# Patient Record
Sex: MALE | Race: WHITE | Employment: STUDENT | URBAN - METROPOLITAN AREA
[De-identification: names, ages, dates, MRNs, and addresses within clinical notes are randomized per-mention and may not be internally consistent; named-entity substitution may affect disease eponyms.]

---
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
## 2024-08-02 ENCOUNTER — OFFICE VISIT (OUTPATIENT)
Dept: FAMILY MEDICINE CLINIC | Facility: CLINIC | Age: 19
End: 2024-08-02
Payer: COMMERCIAL

## 2024-08-02 VITALS
HEART RATE: 61 BPM | WEIGHT: 240 LBS | HEIGHT: 76 IN | BODY MASS INDEX: 29.22 KG/M2 | DIASTOLIC BLOOD PRESSURE: 63 MMHG | RESPIRATION RATE: 16 BRPM | SYSTOLIC BLOOD PRESSURE: 138 MMHG | TEMPERATURE: 97 F | OXYGEN SATURATION: 96 %

## 2024-08-02 DIAGNOSIS — B34.9 VIRAL ILLNESS: Primary | ICD-10-CM

## 2024-08-02 LAB
OPERATOR ID: NORMAL
RAPID SARS-COV-2 BY PCR: NOT DETECTED

## 2024-08-02 PROCEDURE — U0002 COVID-19 LAB TEST NON-CDC: HCPCS | Performed by: NURSE PRACTITIONER

## 2024-08-02 PROCEDURE — 99213 OFFICE O/P EST LOW 20 MIN: CPT | Performed by: NURSE PRACTITIONER

## 2024-08-02 NOTE — PROGRESS NOTES
CHIEF COMPLAINT:     Chief Complaint   Patient presents with    Covid-19 Test     SX x 2 days, headache, nausea, recent exposure to Covid  Denies fever   Denies OTC medication         HPI:   Phillip Torres is a 19 year old male who presents for upper respiratory symptoms for  2 days. Patient reports  upset stomach, nausea, headache,, rhinorrhea, congestion,  and diarrhea (2-3 yesterday, none today) . Symptoms have been stable since onset.  Treating symptoms with none.   Associated symptoms include see above.    Mom recently tested positive for covid. Pt would like covid test.     Eating and drinking. No black or bloody stool.     Current Outpatient Medications   Medication Sig Dispense Refill    EPINEPHrine (EPIPEN 2-DRU) 0.3 MG/0.3ML Injection Device 2 duopak (4 pens total); auto inject IM x1 then call 911. 4 Device 3    Ibuprofen 200 MG Oral Cap Take by mouth. Taking 800mg as needed (Patient not taking: Reported on 8/2/2024)      acetaminophen 500 MG Oral Tab Take 500 mg by mouth every 6 (six) hours as needed for Pain. 1500 mg as needed (Patient not taking: Reported on 8/2/2024)      EPINEPHrine (EPIPEN 2-DRU) 0.3 MG/0.3ML Injection Solution Auto-injector Use as directed (Patient not taking: Reported on 9/18/2020) 1 each 2      Past Medical History:    ECZEMA      FOOD ALLERGY        Past Surgical History:   Procedure Laterality Date    Other surgical history Left     Ankle sx,    Tonsillectomy           Social History     Socioeconomic History    Marital status: Single   Tobacco Use    Smoking status: Never     Passive exposure: Yes    Smokeless tobacco: Never   Vaping Use    Vaping status: Never Used   Substance and Sexual Activity    Alcohol use: Yes    Drug use: Yes     Frequency: 2.0 times per week     Types: Cannabis     Comment: Started to use this summer     Sexual activity: Not Currently     Partners: Female     Birth control/protection: Condom         REVIEW OF SYSTEMS:   GENERAL: good appetite  SKIN: no  rashes or abnormal skin lesions  HEENT: See HPI. No ear pain or sore throat.   LUNGS: denies shortness of breath or wheezing, See HPI  CARDIOVASCULAR: denies chest pain or palpitations   GI: denies emesis No abdominal pain  NEURO: + headaches    EXAM:   /63   Pulse 61   Temp 97.4 °F (36.3 °C)   Resp 16   Ht 6' 4\" (1.93 m)   Wt 240 lb (108.9 kg)   SpO2 96%   BMI 29.21 kg/m²   GENERAL: well developed, well nourished,in no apparent distress  SKIN: no rashes,no suspicious lesions  HEAD: atraumatic, normocephalic.  No tenderness on palpation of  sinuses  EYES: conjunctiva clear, EOM intact  EARS: TM's gray, no bulging, no retraction,no fluid, bony landmarks visible  NOSE: Nostrils patent, no nasal discharge, nasal mucosa pink   THROAT: Oral mucosa pink, moist. Posterior pharynx is not erythematous. no exudates. Tonsils 1/4.    NECK: Supple, non-tender  LUNGS: clear to auscultation bilaterally, no wheezes or rhonchi. Breathing is non labored.  CARDIO: RRR without murmur  GI: ABD soft and non distended. Active BS's x4,no masses, hepatosplenomegaly, or tenderness on direct palpation  EXTREMITIES: no cyanosis, clubbing or edema  LYMPH:  No lymphadenopathy.      Recent Results (from the past 168 hour(s))   Rapid Covid-19    Collection Time: 08/02/24  6:29 PM    Specimen: Nares   Result Value Ref Range    Rapid SARS-CoV-2 by PCR Not Detected Not Detected    POCT Lot Number [id number]     POCT Expiration Date 12/18/2025     POCT Procedure Control Control Valid Control Valid     ,365          ASSESSMENT AND PLAN:   Phillip Torres is a 19 year old male who presents with upper respiratory symptoms that are consistent with    ASSESSMENT:   Encounter Diagnosis   Name Primary?    Viral illness Yes       PLAN: Meds as below.  Comfort care as described in Patient Instructions    Meds & Refills for this Visit:  Requested Prescriptions      No prescriptions requested or ordered in this encounter     Rapid covid is  negative.     Likely viral. Encouraged patient to test at home or obtain order via his PCP for repeat testing if symptom worsen. Pt voiced understanding.     Discussed with patient that their blood pressure is elevated. Advised patient to follow up with their PCP within 1 week for further management and evaluation. Pt verbalized understanding.     Luna diet discussed.     Discussed s/s of worsening infection/condition with Patient and importance of prompt medical re-evaluation including when to seek emergency care. Patient  voiced understanding    Increase fluids and rest.     May consider OTC tylenol or ibuprofen as needed and directed on packaging for pain/fever    May consider OTC saline nasal spray per box instructions    All questions and concerns addressed. Encouraged Patient  to call clinic with any questions or concerns. I explained to the patient that emergent conditions may arise and to go to the ER for new, worsening or any persistent conditions.    Patient Instructions   See attached patient care instructions.      The patient indicates understanding of these issues and agrees to the plan.  The patient is asked to return if sx's persist or worsen.

## 2025-01-09 ENCOUNTER — HOSPITAL ENCOUNTER (EMERGENCY)
Facility: HOSPITAL | Age: 20
Discharge: HOME | End: 2025-01-09
Attending: PEDIATRICS
Payer: COMMERCIAL

## 2025-01-09 ENCOUNTER — APPOINTMENT (OUTPATIENT)
Dept: GENERAL RADIOLOGY | Facility: HOSPITAL | Age: 20
End: 2025-01-09
Attending: PEDIATRICS
Payer: COMMERCIAL

## 2025-01-09 VITALS
TEMPERATURE: 98 F | DIASTOLIC BLOOD PRESSURE: 71 MMHG | RESPIRATION RATE: 20 BRPM | BODY MASS INDEX: 32 KG/M2 | SYSTOLIC BLOOD PRESSURE: 141 MMHG | HEART RATE: 59 BPM | WEIGHT: 264.56 LBS | OXYGEN SATURATION: 98 %

## 2025-01-09 DIAGNOSIS — J20.9 ACUTE BRONCHITIS, UNSPECIFIED ORGANISM: Primary | ICD-10-CM

## 2025-01-09 LAB
ALBUMIN SERPL-MCNC: 4.9 G/DL (ref 3.2–4.8)
ALBUMIN/GLOB SERPL: 1.6 {RATIO} (ref 1–2)
ALP LIVER SERPL-CCNC: 61 U/L
ALT SERPL-CCNC: 91 U/L
ANION GAP SERPL CALC-SCNC: 11 MMOL/L (ref 0–18)
AST SERPL-CCNC: 35 U/L (ref ?–34)
ATRIAL RATE: 72 BPM
BASOPHILS # BLD AUTO: 0.08 X10(3) UL (ref 0–0.2)
BASOPHILS NFR BLD AUTO: 0.7 %
BILIRUB SERPL-MCNC: 1 MG/DL (ref 0.3–1.2)
BUN BLD-MCNC: 12 MG/DL (ref 9–23)
CALCIUM BLD-MCNC: 10.1 MG/DL (ref 8.7–10.4)
CHLORIDE SERPL-SCNC: 105 MMOL/L (ref 98–112)
CO2 SERPL-SCNC: 23 MMOL/L (ref 21–32)
CREAT BLD-MCNC: 0.99 MG/DL
CRP SERPL-MCNC: 2.8 MG/DL (ref ?–0.5)
EGFRCR SERPLBLD CKD-EPI 2021: 112 ML/MIN/1.73M2 (ref 60–?)
EOSINOPHIL # BLD AUTO: 0.43 X10(3) UL (ref 0–0.7)
EOSINOPHIL NFR BLD AUTO: 3.9 %
ERYTHROCYTE [DISTWIDTH] IN BLOOD BY AUTOMATED COUNT: 12.3 %
FLUAV + FLUBV RNA SPEC NAA+PROBE: NEGATIVE
FLUAV + FLUBV RNA SPEC NAA+PROBE: NEGATIVE
GLOBULIN PLAS-MCNC: 3.1 G/DL (ref 2–3.5)
GLUCOSE BLD-MCNC: 101 MG/DL (ref 70–99)
HCT VFR BLD AUTO: 45.6 %
HGB BLD-MCNC: 16.6 G/DL
IMM GRANULOCYTES # BLD AUTO: 0.08 X10(3) UL (ref 0–1)
IMM GRANULOCYTES NFR BLD: 0.7 %
LIPASE SERPL-CCNC: 33 U/L (ref 12–53)
LYMPHOCYTES # BLD AUTO: 1.11 X10(3) UL (ref 1–4)
LYMPHOCYTES NFR BLD AUTO: 10.1 %
MCH RBC QN AUTO: 29.7 PG (ref 26–34)
MCHC RBC AUTO-ENTMCNC: 36.4 G/DL (ref 31–37)
MCV RBC AUTO: 81.7 FL
MONOCYTES # BLD AUTO: 1.01 X10(3) UL (ref 0.1–1)
MONOCYTES NFR BLD AUTO: 9.2 %
NEUTROPHILS # BLD AUTO: 8.23 X10 (3) UL (ref 1.5–7.7)
NEUTROPHILS # BLD AUTO: 8.23 X10(3) UL (ref 1.5–7.7)
NEUTROPHILS NFR BLD AUTO: 75.4 %
OSMOLALITY SERPL CALC.SUM OF ELEC: 288 MOSM/KG (ref 275–295)
P AXIS: 59 DEGREES
P-R INTERVAL: 160 MS
PLATELET # BLD AUTO: 273 10(3)UL (ref 150–450)
POTASSIUM SERPL-SCNC: 3.8 MMOL/L (ref 3.5–5.1)
PROT SERPL-MCNC: 8 G/DL (ref 5.7–8.2)
Q-T INTERVAL: 362 MS
QRS DURATION: 78 MS
QTC CALCULATION (BEZET): 396 MS
R AXIS: 58 DEGREES
RBC # BLD AUTO: 5.58 X10(6)UL
RSV RNA SPEC NAA+PROBE: NEGATIVE
SARS-COV-2 RNA RESP QL NAA+PROBE: NOT DETECTED
SODIUM SERPL-SCNC: 139 MMOL/L (ref 136–145)
T AXIS: 40 DEGREES
VENTRICULAR RATE: 72 BPM
WBC # BLD AUTO: 10.9 X10(3) UL (ref 4–11)

## 2025-01-09 PROCEDURE — 99285 EMERGENCY DEPT VISIT HI MDM: CPT

## 2025-01-09 PROCEDURE — 96375 TX/PRO/DX INJ NEW DRUG ADDON: CPT

## 2025-01-09 PROCEDURE — 71045 X-RAY EXAM CHEST 1 VIEW: CPT | Performed by: PEDIATRICS

## 2025-01-09 PROCEDURE — 0241U SARS-COV-2/FLU A AND B/RSV BY PCR (GENEXPERT): CPT | Performed by: PEDIATRICS

## 2025-01-09 PROCEDURE — 96374 THER/PROPH/DIAG INJ IV PUSH: CPT

## 2025-01-09 PROCEDURE — 87430 STREP A AG IA: CPT | Performed by: PEDIATRICS

## 2025-01-09 PROCEDURE — 93005 ELECTROCARDIOGRAM TRACING: CPT

## 2025-01-09 PROCEDURE — 99284 EMERGENCY DEPT VISIT MOD MDM: CPT

## 2025-01-09 PROCEDURE — 85025 COMPLETE CBC W/AUTO DIFF WBC: CPT | Performed by: PEDIATRICS

## 2025-01-09 PROCEDURE — 96361 HYDRATE IV INFUSION ADD-ON: CPT

## 2025-01-09 PROCEDURE — 80053 COMPREHEN METABOLIC PANEL: CPT | Performed by: PEDIATRICS

## 2025-01-09 PROCEDURE — 93010 ELECTROCARDIOGRAM REPORT: CPT

## 2025-01-09 PROCEDURE — 83690 ASSAY OF LIPASE: CPT | Performed by: PEDIATRICS

## 2025-01-09 PROCEDURE — 86140 C-REACTIVE PROTEIN: CPT | Performed by: PEDIATRICS

## 2025-01-09 RX ORDER — KETOROLAC TROMETHAMINE 30 MG/ML
15 INJECTION, SOLUTION INTRAMUSCULAR; INTRAVENOUS ONCE
Status: COMPLETED | OUTPATIENT
Start: 2025-01-09 | End: 2025-01-09

## 2025-01-09 RX ORDER — DEXTROAMPHETAMINE SACCHARATE, AMPHETAMINE ASPARTATE MONOHYDRATE, DEXTROAMPHETAMINE SULFATE AND AMPHETAMINE SULFATE 2.5; 2.5; 2.5; 2.5 MG/1; MG/1; MG/1; MG/1
10 CAPSULE, EXTENDED RELEASE ORAL DAILY
COMMUNITY
Start: 2024-12-12 | End: 2025-01-11

## 2025-01-09 RX ORDER — DEXAMETHASONE 4 MG/1
16 TABLET ORAL ONCE
Status: COMPLETED | OUTPATIENT
Start: 2025-01-09 | End: 2025-01-09

## 2025-01-09 RX ORDER — ONDANSETRON 4 MG/1
4 TABLET, ORALLY DISINTEGRATING ORAL EVERY 6 HOURS PRN
Qty: 10 TABLET | Refills: 0 | Status: SHIPPED | OUTPATIENT
Start: 2025-01-09 | End: 2025-01-16

## 2025-01-09 RX ORDER — ONDANSETRON 2 MG/ML
4 INJECTION INTRAMUSCULAR; INTRAVENOUS ONCE
Status: COMPLETED | OUTPATIENT
Start: 2025-01-09 | End: 2025-01-09

## 2025-01-09 RX ORDER — AZITHROMYCIN 250 MG/1
TABLET, FILM COATED ORAL
Qty: 6 TABLET | Refills: 0 | Status: SHIPPED | OUTPATIENT
Start: 2025-01-09 | End: 2025-01-14

## 2025-01-09 NOTE — PROGRESS NOTES
Pt resting quietly, continues with congested cough, decadron given. VS stable. Await further plan

## 2025-01-09 NOTE — ED PROVIDER NOTES
Patient Seen in: [facility] Emergency Department      History     Chief Complaint   Patient presents with    Nausea/Vomiting/Diarrhea    Abdomen/Flank Pain     Stated Complaint: vomiting, abd pain    Subjective:   19-year-old male with a history of food allergies and chronic abdominal pain presents to the ED with several days of cough URI symptoms congestion sore throat generalized malaise nausea vomiting and epigastric abdominal pain.  No associated fevers, diarrhea, urinary symptoms or rash.  States that he has had an extensive GI workup including endoscopy however no prior abdominal surgeries.              Objective:     Past Medical History:    ADHD    ECZEMA      FOOD ALLERGY                Past Surgical History:   Procedure Laterality Date    Other surgical history Left     Ankle sx,    Tonsillectomy                  Social History     Socioeconomic History    Marital status: Single   Tobacco Use    Smoking status: Never     Passive exposure: Yes    Smokeless tobacco: Never   Vaping Use    Vaping status: Never Used   Substance and Sexual Activity    Alcohol use: Yes    Drug use: Yes     Frequency: 4.0 times per week     Types: Cannabis     Comment: Started to use this summer     Sexual activity: Not Currently     Partners: Female     Birth control/protection: Condom                  Physical Exam     ED Triage Vitals   BP 01/09/25 1143 142/90   Pulse 01/09/25 1143 94   Resp 01/09/25 1143 18   Temp 01/09/25 1215 98.4 °F (36.9 °C)   Temp src --    SpO2 01/09/25 1143 95 %   O2 Device 01/09/25 1143 None (Room air)       Current Vitals:   Vital Signs  BP: 129/72  Pulse: 66  Resp: 20  Temp: 98.4 °F (36.9 °C)  MAP (mmHg): 89    Oxygen Therapy  SpO2: 97 %  O2 Device: None (Room air)        Physical Exam  Vitals and nursing note reviewed.   Constitutional:       General: He is not in acute distress.     Appearance: Normal appearance. He is not ill-appearing, toxic-appearing or diaphoretic.      Comments:  Afebrile, nontoxic-appearing   HENT:      Head: Normocephalic and atraumatic.      Ears:      Comments: TMs with clear serous fluid nonbulging     Nose: Congestion present.      Mouth/Throat:      Mouth: Mucous membranes are moist.      Pharynx: Oropharynx is clear. Posterior oropharyngeal erythema present. No oropharyngeal exudate.   Eyes:      Extraocular Movements: Extraocular movements intact.      Conjunctiva/sclera: Conjunctivae normal.      Pupils: Pupils are equal, round, and reactive to light.   Cardiovascular:      Rate and Rhythm: Normal rate and regular rhythm.      Pulses: Normal pulses.      Heart sounds: Normal heart sounds.   Pulmonary:      Effort: Pulmonary effort is normal. No respiratory distress.      Breath sounds: Normal breath sounds. No stridor. No wheezing.      Comments: Respiratory rate 18, sats 95% on room air, coughing however no retractions wheezes or crackles  Abdominal:      General: Abdomen is flat. There is no distension.      Palpations: Abdomen is soft.      Comments: Very mild epigastric tenderness, no rebound or guarding    No right lower quadrant or left lower quadrant tenderness   Musculoskeletal:         General: Normal range of motion.      Cervical back: Normal range of motion and neck supple. No rigidity.   Skin:     General: Skin is warm.      Capillary Refill: Capillary refill takes less than 2 seconds.   Neurological:      General: No focal deficit present.      Mental Status: He is alert and oriented to person, place, and time.      Cranial Nerves: No cranial nerve deficit.             ED Course     Labs Reviewed   COMP METABOLIC PANEL (14) - Abnormal; Notable for the following components:       Result Value    Glucose 101 (*)     AST 35 (*)     ALT 91 (*)     Albumin 4.9 (*)     All other components within normal limits   CBC WITH DIFFERENTIAL WITH PLATELET - Abnormal; Notable for the following components:    Neutrophil Absolute Prelim 8.23 (*)     Neutrophil  Absolute 8.23 (*)     Monocyte Absolute 1.01 (*)     All other components within normal limits   C-REACTIVE PROTEIN - Abnormal; Notable for the following components:    C-Reactive Protein 2.80 (*)     All other components within normal limits   LIPASE - Normal   RAPID STREP A SCREEN (LC) - Normal    Narrative:     A confirmatory culture is recommended if clinically indicated.   SARS-COV-2/FLU A AND B/RSV BY PCR (GENEXPERT) - Normal    Narrative:     This test is intended for the qualitative detection and differentiation of SARS-CoV-2, influenza A, influenza B, and respiratory syncytial virus (RSV) viral RNA in nasopharyngeal or nares swabs from individuals suspected of respiratory viral infection consistent with COVID-19 by their healthcare provider. Signs and symptoms of respiratory viral infection due to SARS-CoV-2, influenza, and RSV can be similar.    Test performed using the Xpert Xpress SARS-CoV-2/FLU/RSV (real time RT-PCR)  assay on the [employer] instrument, [employer], [place] [address].   This test is being used under the Food and Drug Administration's Emergency Use Authorization.    The authorized Fact Sheet for Healthcare Providers for this assay is available upon request from the laboratory.       ED Course as of 01/09/25 1326  ------------------------------------------------------------  Time: 01/09 1228  Comment: Rapid strep negative  ------------------------------------------------------------  Time: 01/09 1238  Comment: Serum lab work notable for mildly elevated CRP otherwise mostly unremarkable  ------------------------------------------------------------  Time: 01/09 1247  Comment: Chest x-ray without focal consolidation or pneumonia  ------------------------------------------------------------  Time: 01/09 1325  Comment: Patient remains well-appearing with reassuring physical exam and vital signs.  Will discharge home with as needed Zofran and a Z-Seamus course.  Recommend close PCP follow-up with  strict return precautions to the ED.       Assessment & Plan: Well-appearing with likely acute viral syndrome/bronchitis.  Possible pneumonia.  Low concern for acute surgical abdomen or obstruction.  Will obtain viral and strep swabs as well as serum lab work and chest x-ray.  Patient will receive IV fluid bolus Toradol Zofran and Decadron.     Independent historian:   Pertinent co-morbidities affecting presentation: None   Differential diagnoses considered: I considered various etiologies / differetial diagosis including but not limited to, viral syndrome, bronchitis, pneumonia, gastritis, low concern for acute surgical abdomen or obstruction. The patient was well-appearing and did not show any evidence of serious bacterial infection.  Diagnostic tests considered but not performed: Abdominal CT -very low suspicion for acute surgical abdomen    ED Course:    Prescription drug management considerations: prn Zofran, Z-memo  Consideration regarding hospitalization or escalation of care: None at this time  Social determinants of health: None       I have considered other serious etiologies for this patient's complaints, however the presentation is not consistent with such entities. Patient was screened and evaluated during this visit.   As a treating physician attending to the patient, I determined, within reasonable clinical confidence and prior to discharge, that an emergency medical condition was not or was no longer present. Patient or caregiver understands the course of events that occurred in the emergency department. Instructions when to seek emergent medical care was reviewed. Advised parent or caregiver to follow up with primary care physician.        This report has been produced using speech recognition software and may contain errors related to that system including, but not limited to, errors in grammar, punctuation, and spelling, as well as words and phrases that possibly may have been recognized  inappropriately.  If there are any questions or concerns, contact the dictating provider for clarification.         MDM      Radiology:  Imaging ordered independently visualized and interpreted by myself (along with review of radiologist's interpretation) and noted the following: CXR without focal consolidation or pneumonia    XR CHEST AP PORTABLE  (CPT=71045)    Result Date: 1/9/2025  CONCLUSION:  Normal cardiac and mediastinal contours.  No pulmonary edema or focal airspace consolidation.  The pleural spaces are clear.  Regional osseous structures are normal.    LOCATION:  [facility]      Dictated by (CST): Yanna Barraza MD on 1/09/2025 at 1:05 PM     Finalized by (CST): Yanna Barraza MD on 1/09/2025 at 1:06 PM        Labs:  ^^ Personally ordered, reviewed, and interpreted all unique tests ordered.  Clinically significant labs noted: serum lab work mostly unremarkable    Medications administered:  Medications   lactated ringers IV bolus 1,000 mL (1,000 mL Intravenous New Bag 1/9/25 1150)   ondansetron (Zofran) 4 MG/2ML injection 4 mg (4 mg Intravenous Given 1/9/25 1149)   ketorolac (Toradol) 30 MG/ML injection 15 mg (15 mg Intravenous Given 1/9/25 1149)   dexamethasone (Decadron) tab 16 mg (16 mg Oral Given 1/9/25 1236)       Pulse oximetry:  Pulse oximetry on room air is 95% and is normal.     Cardiac monitoring:  Initial heart rate is 94 and is normal for age    Vital signs:  Vitals:    01/09/25 1153 01/09/25 1200 01/09/25 1215 01/09/25 1230   BP:    129/72   Pulse:  84  66   Resp:  23  20   Temp:   98.4 °F (36.9 °C)    SpO2:  94%  97%   Weight: 120 kg          Chart review:  ^^ Review of prior external notes from unique sources (non-[facility] ED records): noted in history : 1/7/25: PCP office visit for URI    Independent EKG interpretation (interpreted by myself):  Rhythm: normal sinus rhythm     Rate: 72   Axis: Normal   Intervals: QTc= 396ms,  QRS=  78ms  St segements:  normal  t-waves: normal   Overall read: No  abnormalities    Magdi Zayas MD   12:29 PM        Disposition and Plan     Clinical Impression:  1. Acute bronchitis, unspecified organism         Disposition:  Discharge  1/9/2025  1:25 pm    Follow-up:  Moe Finch MD  [address] [place] [address]  [address]  [facility] [address]  [phone]    Schedule an appointment as soon as possible for a visit      [facility] Emergency Department  [address] [facility] [address]  [phone]  Follow up  If symptoms worsen          Medications Prescribed:  Current Discharge Medication List        START taking these medications    Details   ondansetron 4 MG Oral Tablet Dispersible Take 1 tablet (4 mg total) by mouth every 6 (six) hours as needed.  Qty: 10 tablet, Refills: 0      azithromycin (ZITHROMAX Z-DRU) 250 MG Oral Tab 500 mg once followed by 250 mg daily x 4 days  Qty: 6 tablet, Refills: 0                 Supplementary Documentation:

## 2025-01-09 NOTE — ED QUICK NOTES
Chief complaint:   Chief Complaint   Patient presents with   â¢ Derm Problem     on face started 3 days ago. Vitals:  Visit Vitals  /82 (BP Location: RUST, Patient Position: Sitting, Cuff Size: Large Adult)   Pulse 88   Ht 6' (1.829 m)   Wt 102.7 kg   SpO2 98%   BMI 30.69 kg/mÂ²       HISTORY OF PRESENT ILLNESS     HPI  This is a 79-year-old gentleman with history of migraine, Gilbert's syndrome, impaired fasting glucose, retinitis pigmentosa, vitamin D deficiency, hypertension, presented with a rash at the temples that started 3 days ago. He is also having some rash at both groin areas extended towards the upper inner thighs. He had noticed the rash at the groin areas for the last couple weeks. He was diagnosed with herpes in the past, the rash was pretty much the same. He was not tested for HSV. He always thought that this rash was associated with herpes. He had noticed slight this formation at the temples which had started 3 days ago, he did have stripping of hair at this area by the cortez prior to the rash. Other significant problems:  Patient Active Problem List    Diagnosis Date Noted   â¢ Peripheral visual field defect 03/24/2016     Priority: Low   â¢ Migraine headache without aura 08/07/2014     Priority: Low   â¢ Meralgia paresthetica of left side 08/07/2014     Priority: Low       PAST MEDICAL, FAMILY AND SOCIAL HISTORY     Medications:  Current Outpatient Medications   Medication   â¢ losartan (COZAAR) 100 MG tablet   â¢ Eletriptan Hydrobromide (RELPAX PO)   â¢ SUMAtriptan (IMITREX STATDOSE) 6 MG/0.5ML auto-injector   â¢ ketoconazole (NIZORAL) 2 % cream   â¢ Cholecalciferol (VITAMIN D3) 5000 units capsule     No current facility-administered medications for this visit.         Allergies:  ALLERGIES:  No Known Allergies    Past Medical  History/Surgeries:  Past Medical History:   Diagnosis Date   â¢ Cataract, left eye    â¢ Essential (primary) hypertension    â¢ Gilbert syndrome    â¢ Meralgia paresthetica PIV removed, tip intact. VS stable. Pt reports feeling better from initial. DCI discussed with pt and mom who verbalized understanding. Pt ambulating steady out of ER   of left side 8/7/2014   â¢ Migraine    â¢ Retinitis pigmentosa of both eyes    â¢ Vitamin D deficiency        Past Surgical History:   Procedure Laterality Date   â¢ Eye surgery      right cataract       Family History:  Family History   Problem Relation Age of Onset   â¢ Stroke Father    â¢ Diabetes Father    â¢ High blood pressure Mother    â¢ Arthritis Mother    â¢ Migraines Brother        Social History:  Social History     Tobacco Use   â¢ Smoking status: Former Smoker     Packs/day: 0.50     Types: Pipe   â¢ Smokeless tobacco: Former User   â¢ Tobacco comment: Stopped all smoking    Substance Use Topics   â¢ Alcohol use: No     Alcohol/week: 0.0 oz       REVIEW OF SYSTEMS     Review of Systems  Constitutional:  Denies fever, chills. HENT:  Denies nasal congestion or sore throat. Respiratory:  Denies cough or shortness of breath. Cardiovascular:  Denies chest pain or edema. Gastrointestinal:  Denies abdominal pain, N/V, constipation or diarrhea, melena. Genitourinary:  Denies dysuria, frequency of urination    PHYSICAL EXAM     Physical Exam  General:  Well developed, well nourished. In no apparent distress. Eyes:  PERRL, EOMI. Conjunctivae pink. Sclerae anicteric. HENT:  Normocephalic. Oral mucosa moist  Respiratory:  Normal respiratory effort. Lungs clear to auscultation bilaterally. Cardiovascular:  Regular rate and rhythm. Normal S1 and S2. Gastrointestinal:  Soft. Nontender. Musculoskeletal:  No clubbing or cyanosis. Integumentary:  Warm. Dry. Pink. Patient has well-demarcated erythematous rash at both groin areas with slight discoloration at the Center of the lesion on both sides. She has slight this formation at the temples. Lymphatic:  No lymphadenopathy in submental, submandibular or cervical chain. No supraclavicular or infraclavicular lymphadenopathy. Psychiatric:  Cooperative. Appropriate mood and affect. Normal judgment. ASSESSMENT/PLAN     1.  Tinea cruris  Patient will be started on ketoconazole 2% cream apply twice a day for 3 weeks to the groin areas. Patient reassured that this is nothing to the with herpes although he would like to have HSV tested in the blood.     2. Screen for STD (sexually transmitted disease)  If negative, patient will be reassured, if it's positive that does not mean that he an active infection, patient informed that it would reflect an infection in the past.  - HERPES SIMPLEX VIRUS 2 GLYCOPROTEIN; Future  - HERPES SIMPLEX VIRUS 1 GLYCOPROTEIN; Future    Bethany Stephens MD

## 2025-01-09 NOTE — ED INITIAL ASSESSMENT (HPI)
Pt here for evaluation of cough/congestion  Per pt, \"I have a very sore throat, a lot of mucous in my chest and throat. I was throwing up this morning mostly phlegm. I've had stomach issues for a while but I don't know if this from that or from another illness. I was having belly pain earlier but it's better its pretty bad when I cough. I'm very lightheaded. I'm trying to drink but not able to eat much food.\"    Pt presents alert, tired appearing, +congested cough, c/o discomfort in chest and upper abdomen worse with coughing  Lungs overall clear  Abd soft, mild tenderness to upper abdomen,ND,+Bsx4  Skin pink, warm, well perfused

## 2025-01-26 ENCOUNTER — APPOINTMENT (OUTPATIENT)
Dept: ULTRASOUND IMAGING | Age: 20
End: 2025-01-26
Attending: EMERGENCY MEDICINE

## 2025-01-26 ENCOUNTER — HOSPITAL ENCOUNTER (EMERGENCY)
Age: 20
Discharge: HOME | End: 2025-01-26
Attending: EMERGENCY MEDICINE

## 2025-01-26 VITALS
SYSTOLIC BLOOD PRESSURE: 131 MMHG | HEART RATE: 92 BPM | HEIGHT: 76 IN | RESPIRATION RATE: 20 BRPM | OXYGEN SATURATION: 96 % | BODY MASS INDEX: 26.18 KG/M2 | TEMPERATURE: 99 F | DIASTOLIC BLOOD PRESSURE: 74 MMHG | WEIGHT: 215 LBS

## 2025-01-26 DIAGNOSIS — J10.1 INFLUENZA A: ICD-10-CM

## 2025-01-26 DIAGNOSIS — R11.15 CYCLIC VOMITING SYNDROME: Primary | ICD-10-CM

## 2025-01-26 LAB
ALBUMIN SERPL-MCNC: 4.4 G/DL (ref 3.4–5)
ALBUMIN/GLOB SERPL: 1.3 {RATIO} (ref 1–2.4)
ALP SERPL-CCNC: 68 UNITS/L (ref 45–117)
ALT SERPL-CCNC: 67 UNITS/L
ANION GAP SERPL CALC-SCNC: 12 MMOL/L (ref 7–19)
AST SERPL-CCNC: 21 UNITS/L
BASOPHILS # BLD: 0.1 K/MCL (ref 0–0.3)
BASOPHILS NFR BLD: 1 %
BILIRUB SERPL-MCNC: 1 MG/DL (ref 0.2–1)
BUN SERPL-MCNC: 13 MG/DL (ref 6–20)
BUN/CREAT SERPL: 16 (ref 7–25)
CALCIUM SERPL-MCNC: 9.4 MG/DL (ref 8.4–10.2)
CHLORIDE SERPL-SCNC: 108 MMOL/L (ref 97–110)
CO2 SERPL-SCNC: 20 MMOL/L (ref 21–32)
CREAT SERPL-MCNC: 0.82 MG/DL (ref 0.67–1.17)
DEPRECATED RDW RBC: 37 FL (ref 39–50)
EGFRCR SERPLBLD CKD-EPI 2021: >90 ML/MIN/{1.73_M2}
EOSINOPHIL # BLD: 0.1 K/MCL (ref 0–0.5)
EOSINOPHIL NFR BLD: 1 %
ERYTHROCYTE [DISTWIDTH] IN BLOOD: 12.5 % (ref 11–15)
FASTING DURATION TIME PATIENT: ABNORMAL H
FLUAV RNA RESP QL NAA+PROBE: DETECTED
FLUBV RNA RESP QL NAA+PROBE: NOT DETECTED
GLOBULIN SER-MCNC: 3.5 G/DL (ref 2–4)
GLUCOSE SERPL-MCNC: 103 MG/DL (ref 70–99)
HCT VFR BLD CALC: 42.2 % (ref 39–51)
HGB BLD-MCNC: 14.8 G/DL (ref 13–17)
IMM GRANULOCYTES # BLD AUTO: 0 K/MCL (ref 0–0.2)
IMM GRANULOCYTES # BLD: 1 %
LIPASE SERPL-CCNC: 27 UNITS/L (ref 15–77)
LYMPHOCYTES # BLD: 0.9 K/MCL (ref 1.2–5.2)
LYMPHOCYTES NFR BLD: 11 %
MCH RBC QN AUTO: 28.8 PG (ref 26–34)
MCHC RBC AUTO-ENTMCNC: 35.1 G/DL (ref 32–36.5)
MCV RBC AUTO: 82.3 FL (ref 78–100)
MONOCYTES # BLD: 1 K/MCL (ref 0.3–0.9)
MONOCYTES NFR BLD: 12 %
NEUTROPHILS # BLD: 6 K/MCL (ref 1.8–8)
NEUTROPHILS NFR BLD: 74 %
NRBC BLD MANUAL-RTO: 0 /100 WBC
PLATELET # BLD AUTO: 237 K/MCL (ref 140–450)
POTASSIUM SERPL-SCNC: 3.8 MMOL/L (ref 3.4–5.1)
PROT SERPL-MCNC: 7.9 G/DL (ref 6.4–8.2)
RBC # BLD: 5.13 MIL/MCL (ref 4.5–5.9)
RSV AG NPH QL IA.RAPID: NOT DETECTED
SARS-COV-2 RNA RESP QL NAA+PROBE: NOT DETECTED
SERVICE CMNT-IMP: ABNORMAL
SERVICE CMNT-IMP: ABNORMAL
SODIUM SERPL-SCNC: 136 MMOL/L (ref 135–145)
WBC # BLD: 8.1 K/MCL (ref 4.2–11)

## 2025-01-26 PROCEDURE — 76705 ECHO EXAM OF ABDOMEN: CPT

## 2025-01-26 PROCEDURE — 0241U COVID/FLU/RSV PANEL: CPT | Performed by: EMERGENCY MEDICINE

## 2025-01-26 PROCEDURE — 85025 COMPLETE CBC W/AUTO DIFF WBC: CPT | Performed by: EMERGENCY MEDICINE

## 2025-01-26 PROCEDURE — 96375 TX/PRO/DX INJ NEW DRUG ADDON: CPT

## 2025-01-26 PROCEDURE — 80053 COMPREHEN METABOLIC PANEL: CPT | Performed by: EMERGENCY MEDICINE

## 2025-01-26 PROCEDURE — 99284 EMERGENCY DEPT VISIT MOD MDM: CPT

## 2025-01-26 PROCEDURE — 10002800 HB RX 250 W HCPCS: Performed by: EMERGENCY MEDICINE

## 2025-01-26 PROCEDURE — 96374 THER/PROPH/DIAG INJ IV PUSH: CPT

## 2025-01-26 PROCEDURE — 83690 ASSAY OF LIPASE: CPT | Performed by: EMERGENCY MEDICINE

## 2025-01-26 PROCEDURE — 96361 HYDRATE IV INFUSION ADD-ON: CPT

## 2025-01-26 PROCEDURE — 10002807 HB RX 258: Performed by: EMERGENCY MEDICINE

## 2025-01-26 RX ORDER — HALOPERIDOL 5 MG/ML
2 INJECTION INTRAMUSCULAR ONCE
Status: COMPLETED | OUTPATIENT
Start: 2025-01-26 | End: 2025-01-26

## 2025-01-26 RX ORDER — ONDANSETRON 4 MG/1
4 TABLET, ORALLY DISINTEGRATING ORAL EVERY 8 HOURS PRN
Qty: 12 TABLET | Refills: 0 | Status: SHIPPED | OUTPATIENT
Start: 2025-01-26

## 2025-01-26 RX ORDER — HYOSCYAMINE SULFATE 0.125 MG
0.12 TABLET ORAL EVERY 4 HOURS PRN
Qty: 10 TABLET | Refills: 1 | Status: SHIPPED | OUTPATIENT
Start: 2025-01-26

## 2025-01-26 RX ORDER — ONDANSETRON 2 MG/ML
4 INJECTION INTRAMUSCULAR; INTRAVENOUS ONCE
Status: COMPLETED | OUTPATIENT
Start: 2025-01-26 | End: 2025-01-26

## 2025-01-26 RX ADMIN — FENTANYL CITRATE 75 MCG: 50 INJECTION INTRAMUSCULAR; INTRAVENOUS at 15:17

## 2025-01-26 RX ADMIN — ONDANSETRON 4 MG: 2 INJECTION, SOLUTION INTRAMUSCULAR; INTRAVENOUS at 12:58

## 2025-01-26 RX ADMIN — HALOPERIDOL LACTATE 2 MG: 5 INJECTION, SOLUTION INTRAMUSCULAR at 15:17

## 2025-01-26 RX ADMIN — HALOPERIDOL LACTATE 2 MG: 5 INJECTION, SOLUTION INTRAMUSCULAR at 14:58

## 2025-01-26 RX ADMIN — SODIUM CHLORIDE 1000 ML: 9 INJECTION, SOLUTION INTRAVENOUS at 15:17

## 2025-01-26 ASSESSMENT — PAIN SCALES - GENERAL: PAINLEVEL_OUTOF10: 10

## 2025-01-28 ENCOUNTER — HOSPITAL ENCOUNTER (EMERGENCY)
Facility: HOSPITAL | Age: 20
Discharge: HOME | End: 2025-01-28
Attending: PEDIATRICS
Payer: COMMERCIAL

## 2025-01-28 ENCOUNTER — APPOINTMENT (OUTPATIENT)
Dept: GENERAL RADIOLOGY | Facility: HOSPITAL | Age: 20
End: 2025-01-28
Attending: PEDIATRICS
Payer: COMMERCIAL

## 2025-01-28 VITALS
HEART RATE: 71 BPM | RESPIRATION RATE: 25 BRPM | OXYGEN SATURATION: 100 % | TEMPERATURE: 100 F | HEIGHT: 76 IN | BODY MASS INDEX: 31.05 KG/M2 | DIASTOLIC BLOOD PRESSURE: 68 MMHG | WEIGHT: 255 LBS | SYSTOLIC BLOOD PRESSURE: 130 MMHG

## 2025-01-28 DIAGNOSIS — J11.1 INFLUENZA: Primary | ICD-10-CM

## 2025-01-28 DIAGNOSIS — R11.2 NAUSEA AND VOMITING, UNSPECIFIED VOMITING TYPE: ICD-10-CM

## 2025-01-28 LAB
ALBUMIN SERPL-MCNC: 4.9 G/DL (ref 3.2–4.8)
ALBUMIN/GLOB SERPL: 1.9 {RATIO} (ref 1–2)
ALP LIVER SERPL-CCNC: 56 U/L
ALT SERPL-CCNC: 52 U/L
ANION GAP SERPL CALC-SCNC: 13 MMOL/L (ref 0–18)
AST SERPL-CCNC: 27 U/L (ref ?–34)
ATRIAL RATE: 90 BPM
BASOPHILS # BLD AUTO: 0.05 X10(3) UL (ref 0–0.2)
BASOPHILS NFR BLD AUTO: 0.8 %
BILIRUB SERPL-MCNC: 1 MG/DL (ref 0.3–1.2)
BUN BLD-MCNC: 12 MG/DL (ref 9–23)
CALCIUM BLD-MCNC: 9.6 MG/DL (ref 8.7–10.6)
CHLORIDE SERPL-SCNC: 105 MMOL/L (ref 98–112)
CO2 SERPL-SCNC: 21 MMOL/L (ref 21–32)
CREAT BLD-MCNC: 1.02 MG/DL
EGFRCR SERPLBLD CKD-EPI 2021: 108 ML/MIN/1.73M2 (ref 60–?)
EOSINOPHIL # BLD AUTO: 0.01 X10(3) UL (ref 0–0.7)
EOSINOPHIL NFR BLD AUTO: 0.2 %
ERYTHROCYTE [DISTWIDTH] IN BLOOD BY AUTOMATED COUNT: 12.8 %
GLOBULIN PLAS-MCNC: 2.6 G/DL (ref 2–3.5)
GLUCOSE BLD-MCNC: 104 MG/DL (ref 70–99)
HCT VFR BLD AUTO: 39.2 %
HGB BLD-MCNC: 14.6 G/DL
IMM GRANULOCYTES # BLD AUTO: 0.03 X10(3) UL (ref 0–1)
IMM GRANULOCYTES NFR BLD: 0.5 %
LYMPHOCYTES # BLD AUTO: 0.78 X10(3) UL (ref 1–4)
LYMPHOCYTES NFR BLD AUTO: 12.4 %
MCH RBC QN AUTO: 29.9 PG (ref 26–34)
MCHC RBC AUTO-ENTMCNC: 37.2 G/DL (ref 31–37)
MCV RBC AUTO: 80.3 FL
MONOCYTES # BLD AUTO: 0.82 X10(3) UL (ref 0.1–1)
MONOCYTES NFR BLD AUTO: 13.1 %
NEUTROPHILS # BLD AUTO: 4.58 X10 (3) UL (ref 1.5–7.7)
NEUTROPHILS # BLD AUTO: 4.58 X10(3) UL (ref 1.5–7.7)
NEUTROPHILS NFR BLD AUTO: 73 %
OSMOLALITY SERPL CALC.SUM OF ELEC: 288 MOSM/KG (ref 275–295)
P AXIS: 67 DEGREES
P-R INTERVAL: 146 MS
PLATELET # BLD AUTO: 205 10(3)UL (ref 150–450)
POTASSIUM SERPL-SCNC: 3.6 MMOL/L (ref 3.5–5.1)
PROT SERPL-MCNC: 7.5 G/DL (ref 5.7–8.2)
Q-T INTERVAL: 340 MS
QRS DURATION: 76 MS
QTC CALCULATION (BEZET): 415 MS
R AXIS: 74 DEGREES
RBC # BLD AUTO: 4.88 X10(6)UL
SODIUM SERPL-SCNC: 139 MMOL/L (ref 136–145)
T AXIS: 50 DEGREES
TROPONIN I SERPL HS-MCNC: <3 NG/L
VENTRICULAR RATE: 90 BPM
WBC # BLD AUTO: 6.3 X10(3) UL (ref 4–11)

## 2025-01-28 PROCEDURE — 96361 HYDRATE IV INFUSION ADD-ON: CPT

## 2025-01-28 PROCEDURE — 96374 THER/PROPH/DIAG INJ IV PUSH: CPT

## 2025-01-28 PROCEDURE — 71045 X-RAY EXAM CHEST 1 VIEW: CPT | Performed by: PEDIATRICS

## 2025-01-28 PROCEDURE — 80053 COMPREHEN METABOLIC PANEL: CPT | Performed by: PEDIATRICS

## 2025-01-28 PROCEDURE — 96375 TX/PRO/DX INJ NEW DRUG ADDON: CPT

## 2025-01-28 PROCEDURE — 85025 COMPLETE CBC W/AUTO DIFF WBC: CPT | Performed by: PEDIATRICS

## 2025-01-28 PROCEDURE — 93010 ELECTROCARDIOGRAM REPORT: CPT

## 2025-01-28 PROCEDURE — 99284 EMERGENCY DEPT VISIT MOD MDM: CPT

## 2025-01-28 PROCEDURE — 84484 ASSAY OF TROPONIN QUANT: CPT | Performed by: PEDIATRICS

## 2025-01-28 PROCEDURE — 93005 ELECTROCARDIOGRAM TRACING: CPT

## 2025-01-28 RX ORDER — MORPHINE SULFATE 4 MG/ML
2 INJECTION, SOLUTION INTRAMUSCULAR; INTRAVENOUS ONCE
Status: COMPLETED | OUTPATIENT
Start: 2025-01-28 | End: 2025-01-28

## 2025-01-28 RX ORDER — KETOROLAC TROMETHAMINE 30 MG/ML
30 INJECTION, SOLUTION INTRAMUSCULAR; INTRAVENOUS ONCE
Status: COMPLETED | OUTPATIENT
Start: 2025-01-28 | End: 2025-01-28

## 2025-01-28 RX ORDER — METOCLOPRAMIDE HYDROCHLORIDE 5 MG/ML
5 INJECTION INTRAMUSCULAR; INTRAVENOUS ONCE
Status: COMPLETED | OUTPATIENT
Start: 2025-01-28 | End: 2025-01-28

## 2025-01-28 RX ORDER — ONDANSETRON 2 MG/ML
4 INJECTION INTRAMUSCULAR; INTRAVENOUS ONCE
Status: COMPLETED | OUTPATIENT
Start: 2025-01-28 | End: 2025-01-28

## 2025-01-28 RX ORDER — ONDANSETRON 4 MG/1
4 TABLET, FILM COATED ORAL EVERY 8 HOURS PRN
COMMUNITY

## 2025-01-28 NOTE — ED PROVIDER NOTES
Patient Seen in: [facility] Emergency Department      History     Chief Complaint   Patient presents with    Chest Pain Angina    Nausea/Vomiting/Diarrhea     Stated Complaint: Chest pain and vomiting    Subjective:   HPI      Patient is a 19-year-old male with a history of cyclic vomiting syndrome.  He began to have some emesis this morning and then developed some midsternal chest pain.  He states he has had numerous episodes of emesis in the past years but never had midsternal chest pain with it.  States he was diagnosed with influenza about 2 days ago.  He was given Zofran for vomiting but has continued to vomit.  Emesis nonbloody nonbilious.    Objective:     Past Medical History:    ADHD    ECZEMA      FOOD ALLERGY                Past Surgical History:   Procedure Laterality Date    Other surgical history Left     Ankle sx,    Tonsillectomy                  Social History     Socioeconomic History    Marital status: Single   Tobacco Use    Smoking status: Never     Passive exposure: Yes    Smokeless tobacco: Never   Vaping Use    Vaping status: Never Used   Substance and Sexual Activity    Alcohol use: Yes    Drug use: Yes     Frequency: 4.0 times per week     Types: Cannabis     Comment: Started to use this summer     Sexual activity: Not Currently     Partners: Female     Birth control/protection: Condom                  Physical Exam     ED Triage Vitals [01/28/25 1008]   /65   Pulse 99   Resp 25   Temp 99.7 °F (37.6 °C)   Temp src Temporal   SpO2 97 %   O2 Device None (Room air)       Current Vitals:   Vital Signs  BP: 130/68  Pulse: 71  Resp: 25  Temp: 99.7 °F (37.6 °C)  Temp src: Temporal  MAP (mmHg): 82    Oxygen Therapy  SpO2: 100 %  O2 Device: None (Room air)        Physical Exam  HEENT: The pupils are equal round and react to light, oropharynx is clear, mucous membranes are moist.  Ears:left TM shows no erythema, right TM shows no erythema   Neck: Supple, full range of motion.  CV: Chest  is clear to auscultation, no wheezes rales or rhonchi.  Cardiac exam normal S1-S2, no murmurs rubs or gallops.  Abdomen: Soft, nontender, nondistended.  Bowel sounds present throughout.  Extremities: Warm and well perfused.  Dermatologic exam: No rashes or lesions.  Neurologic exam: Cranial nerves 2-12 grossly intact.    Orthopedic exam: normal,from.    ED Course     Labs Reviewed   CBC WITH DIFFERENTIAL WITH PLATELET - Abnormal; Notable for the following components:       Result Value    MCHC 37.2 (*)     Lymphocyte Absolute 0.78 (*)     All other components within normal limits   COMP METABOLIC PANEL (14) - Abnormal; Notable for the following components:    Glucose 104 (*)     ALT 52 (*)     Albumin 4.9 (*)     All other components within normal limits   TROPONIN I HIGH SENSITIVITY - Normal   RAINBOW DRAW BLUE     EKG    Rate, intervals and axes as noted on EKG Report.  Rate: 90  Rhythm: Sinus Rhythm  Reading: normal axis, normal rate, normal intervals, normal ecg                CBC comp reviewed within normal limits.  Troponin normal.  Patient had IV access obtained and received Reglan Toradol fluids morphine and Zofran.  He had excellent improvement of symptoms.  On reexam.  Denied any further pain.    He was able to take p.o. fluids.    Chart review shows numerous visits in the past for cyclic vomiting issues.     MDM      Patient presents with vomiting and recent diagnosis of influenza differential considered includes sequelae of influenza versus vomiting or combination.  No evidence to suggest surgical abdomen also considered on the differential.  Patient responded well to interventions.  He will follow closely with the PMD and return to the ED for worsening of symptoms.    Patient was screened and evaluated during this visit.   As a treating physician attending to the patient, I determined, within reasonable clinical confidence and prior to discharge, that an emergency medical condition was not or was no  longer present.  There was no indication for further evaluation, treatment or admission on an emergency basis.  Comprehensive verbal and written discharge and follow-up instructions were provided to help prevent relapse or worsening.  Patient was instructed to follow-up with the primary care provider for further evaluation and treatment, but to return immediately to the ER for worsening, concerning, new, changing or persisting symptoms.  I discussed the case with the patient/parent and they had no questions, complaints, or concerns.  Patient/parent felt comfortable going home.        Medical Decision Making      Disposition and Plan     Clinical Impression:  1. Influenza    2. Nausea and vomiting, unspecified vomiting type         Disposition:  Discharge  1/28/2025 12:03 pm    Follow-up:  No follow-up provider specified.        Medications Prescribed:  Discharge Medication List as of 1/28/2025 12:05 PM              Supplementary Documentation:

## 2025-01-28 NOTE — ED INITIAL ASSESSMENT (HPI)
Pt with hx of cyclic vomiting.  Pt was dx with flu B x 2 days ago and seen at [facility].  Pt states he has continued to vomit despite taking zofran ODT.  Pt with fevers.  Pt states multiple episodes of vomiting and diarrhea.

## (undated) DIAGNOSIS — M95.8 OSTEOCHONDRAL DEFECT OF ANKLE: Primary | ICD-10-CM

## (undated) DEVICE — Device

## (undated) DEVICE — LAWSON - KIT MINOR BASIN GSA

## (undated) DEVICE — LAWSON - BANDAGE SOFORM CONFORM STL4X75

## (undated) DEVICE — LAWSON - BANDAGE ESMARK STL LF 4INX12FT

## (undated) NOTE — LETTER
January 9, 2025    Patient: Phillip Torres   Date of Visit: 1/9/2025       To Whom It May Concern:    Phillip Torres was seen and treated in our emergency department on 1/9/2025. He is being treated for an illness and should be excused from work during this time. He should not return to work until Tuesday 01/14/25 .    If you have any questions or concerns, please don't hesitate to call.       Encounter Provider(s):    Magdi Zayas MD

## (undated) NOTE — LETTER
Date & Time: 1/28/2025, 12:12 PM  Patient: Phillip Torres  Encounter Provider(s):    Los Shane MD       To Whom It May Concern:    Phillip Torres was seen and treated in our department on 1/28/2025. He has influenza and is experiencing significant nausea and vomiting. He should be excused from work today and should not return until his symptoms have resolved. This may take up to a week from today. He may return at any point with no restrictions once he is feeling better.    If you have any questions or concerns, please do not hesitate to call.        _____________________________  RN Signature

## (undated) NOTE — ED AVS SNAPSHOT
Parent/Legal Guardian Access to the [employer] Record of a Patient 15to 16Years Old  Return completed form by Secure email to [facility] HIM/Medical Records Department: [email or web address].     Requirements and Procedures   Under [facility] MyChart ID and password with another person, that person may be able to view my or my child’s health information, and health information about someone who has authorized me as a MyChart proxy.    ·  I agree that it is my responsibility to select a confident Sign-Up Form and I agree to its terms.        Authorization Form     Please enter Patient’s information below:   Name (last, first, middle initial) __________________________________________   Gender  Male  Female    Last 4 Digits of Social Security Number Parent/Legal Guardian Signature                                  For Patient (1517 years of age)  I agree to allow my parent/legal guardian, named above, online access to my medical information currently available and that may become available as a result

## (undated) NOTE — ED AVS SNAPSHOT
[facility] Emergency Department    [place]  [address] [place] [address]    Phone:  [phone]    Fax:  Michael Suarez   MRN: [id number]    Department:  [facility] Emergency Department   Date of Visit:  3/12/2 IF THERE IS ANY CHANGE OR WORSENING OF YOUR CONDITION, CALL YOUR PRIMARY CARE PHYSICIAN AT ONCE OR RETURN IMMEDIATELY TO THE EMERGENCY DEPARTMENT.     If you have been prescribed any medication(s), please fill your prescription right away and begin taking t

## (undated) NOTE — LETTER
August 2, 2024    Phillip Torres  [address] [place] [address]      Dear Phillip:    The following are the results of your recent tests. Please review the list of test results.  Your result is the value on the left; we have also supplied the range of normal (low and high) values.    Results for orders placed or performed in visit on 08/02/24   Rapid Covid-19    Specimen: Nares   Result Value Ref Range    Rapid SARS-CoV-2 by PCR Not Detected Not Detected    POCT Lot Number [id number]     POCT Expiration Date 12/18/2025     POCT Procedure Control Control Valid Control Valid     ,135        Please call if you have further questions,            Jeffery Dhaliwal, APN  Nurse Practitioner

## (undated) NOTE — ED AVS SNAPSHOT
[facility]   MRN: [id number]    Department:  [facility] Emergency Department   Date of Visit:  1/18/2020           Disclosure     Insurance plans vary and the physician(s) referred by the ER may not be covered by your plan.  Please contact your tell this physician (or your personal doctor if your instructions are to return to your personal doctor) about any new or lasting problems. The primary care or specialist physician will see patients referred from the [facility] Emergency Department.  Marlon Mcdonald

## (undated) NOTE — LETTER
Date & Time: 2/14/2022, 4:56 PM  Patient: Asuncion Chan  Encounter Provider(s):    Ana Guthrie MD       To Whom It May Concern:    Juni Hurley was seen and treated in our department on 2/14/2022. Please excuse patient from sports until cleared by ortho.     If you have any questions or concerns, please do not hesitate to call.        _____________________________  Physician/APC Signature

## (undated) NOTE — ED AVS SNAPSHOT
[facility] Emergency Department    [place]  [address] [place] [address]    Phone:  [phone]    Fax:  Michael Suarez   MRN: [id number]    Department:  [facility] Emergency Department   Date of Visit:  3/12/2 [address] [place] [phone]  Pediatric [phone] Emergency Department   [phone]       To Check ER Wait Times:  TEXT 'ERwait' to 33685      Click [email or web address] org      Or call [phone]    If you have any will be contacted. Please make sure we have your correct phone number before you leave. After you leave, you should follow the attached instructions. I have read and understand the instructions given to me by my caregivers.         [facility] Call the [employer] for assistance with your inactive [employer] account    If you have questions, you can call [phone] to talk to our [facility] Staff. Remember, [employer] is NOT to be used for urgent needs. For medical emergencies, dial 911.     Vi